# Patient Record
Sex: MALE | Race: OTHER | URBAN - METROPOLITAN AREA
[De-identification: names, ages, dates, MRNs, and addresses within clinical notes are randomized per-mention and may not be internally consistent; named-entity substitution may affect disease eponyms.]

---

## 2018-06-11 ENCOUNTER — OFFICE VISIT (OUTPATIENT)
Dept: SURGERY | Facility: CLINIC | Age: 28
End: 2018-06-11

## 2018-06-11 VITALS
WEIGHT: 220 LBS | HEART RATE: 85 BPM | SYSTOLIC BLOOD PRESSURE: 142 MMHG | HEIGHT: 67 IN | BODY MASS INDEX: 34.53 KG/M2 | TEMPERATURE: 99 F | DIASTOLIC BLOOD PRESSURE: 85 MMHG

## 2018-06-11 DIAGNOSIS — N46.9 MALE FERTILITY PROBLEMS: Primary | ICD-10-CM

## 2018-06-11 PROCEDURE — 99244 OFF/OP CNSLTJ NEW/EST MOD 40: CPT | Performed by: UROLOGY

## 2018-06-11 PROCEDURE — 99212 OFFICE O/P EST SF 10 MIN: CPT | Performed by: UROLOGY

## 2019-07-31 NOTE — PROGRESS NOTES
Called and left VM for patient to call back to relay below msg.    SUBJECTIVE:  Lisy Baez is a 28year old male who presents for a consultation at the request of, and a copy of this note will be sent to, Dr. Carson Rollins, for evaluation of  Male infertility. He states that the problem is unchanged.  Symptoms incl stools. GENERAL: Denies:  weight gain, weight loss, fever, night sweats, bone pain, malaise and fatigue. Positive for:  None.   All other review of systems reviewed otherwise negative    OBJECTIVE:  /85 (BP Location: Right arm, Patient Position: Sitt